# Patient Record
Sex: MALE | Race: WHITE | Employment: FULL TIME | URBAN - NONMETROPOLITAN AREA
[De-identification: names, ages, dates, MRNs, and addresses within clinical notes are randomized per-mention and may not be internally consistent; named-entity substitution may affect disease eponyms.]

---

## 2020-08-11 ENCOUNTER — OFFICE VISIT (OUTPATIENT)
Dept: PODIATRY | Age: 30
End: 2020-08-11
Payer: COMMERCIAL

## 2020-08-11 VITALS — WEIGHT: 175 LBS | BODY MASS INDEX: 29.16 KG/M2 | HEIGHT: 65 IN

## 2020-08-11 PROCEDURE — 11730 AVULSION NAIL PLATE SIMPLE 1: CPT | Performed by: PODIATRIST

## 2020-08-11 PROCEDURE — 99203 OFFICE O/P NEW LOW 30 MIN: CPT | Performed by: PODIATRIST

## 2020-08-11 RX ORDER — DOXYCYCLINE HYCLATE 100 MG/1
100 CAPSULE ORAL 2 TIMES DAILY
Qty: 14 CAPSULE | Refills: 0 | Status: SHIPPED | OUTPATIENT
Start: 2020-08-11 | End: 2020-08-18

## 2020-08-11 RX ORDER — HYDROCODONE BITARTRATE AND ACETAMINOPHEN 5; 325 MG/1; MG/1
1 TABLET ORAL EVERY 4 HOURS PRN
COMMUNITY
End: 2020-08-25

## 2020-08-11 RX ORDER — CEPHALEXIN 500 MG/1
500 CAPSULE ORAL 4 TIMES DAILY
COMMUNITY
End: 2020-08-25

## 2020-08-11 NOTE — PROGRESS NOTES
New patient in office referred from [facility] for left foot injury. Dropped a sheet of metal on left foot while at work. Currently wearing post op shoe and using crutches. Xrays obtained prior to apt.     20  Bianka Rubin : 1990 Sex: male  Age: 26 y.o. Patient was referred by Marycruz Rodriges MD    CC:    Injury left great toe at work    HPI:   This pleasant 40-year-old male patient is referred to me today injury left great toe. Was at work over the weekend and did drop a sheet of metal on his left foot. Did have work approved boots on. States has had redness and some drainage left great toe since initial injury. Did go to [facility] and was started on oral antibiotics. Did have radiographs showing fracture left great toe. Has been walking with a surgical shoe with the assistance of crutches. Still having pain left great toe. Still having redness around the toenail. States the nail is very loose. Has not been able to return to work yet. Denies any nausea vomiting fever chills shortness of breath. No additional pedal complaints at this time. ROS:  Const: Denies constitutional symptoms  Musculo: Denies symptoms other than stated above  Skin: Denies symptoms other than stated above       Current Outpatient Medications:     cephALEXin (KEFLEX) 500 MG capsule, Take 500 mg by mouth 4 times daily, Disp: , Rfl:     HYDROcodone-acetaminophen (NORCO) 5-325 MG per tablet, Take 1 tablet by mouth every 4 hours as needed for Pain., Disp: , Rfl:     doxycycline hyclate (VIBRAMYCIN) 100 MG capsule, Take 1 capsule by mouth 2 times daily for 7 days, Disp: 14 capsule, Rfl: 0    aspirin 81 MG tablet, Take 81 mg by mouth daily. , Disp: , Rfl:   Allergies   Allergen Reactions    Pcn [Penicillins]      Rash      Sulfa Antibiotics      Rash         No past medical history on file.         Vitals:    20 0955   Weight: 175 lb (79.4 kg)   Height: 5' 5\" (1.651 m)       Work History/Social History: Focused Lower Extremity Physical Exam:    Neurovascular examination:    Dorsalis Pedis palpable bilateral.  Posterior tibialis palpable bilateral.    Capillary Refill Time:  Immediate return  Hair growth:  Symmetrical and bilateral   Skin:  Not atrophic  Edema: No edema bilateral feet or ankles. Neurologic:  Light touch intact bilateral.      Musculoskeletal/ Orthopedic examination:    Equinis: Absent bilateral  Dorsiflexion, plantarflexion, inversion, eversion bilateral 5 out of 5 muscle strength  Wiggling toes  Negative Homans  Tenderness to palpation entire left great toenail. No pain to palpation metatarsal head left great toe    Dermatology examination:    Surrounding erythema and edema left great toe surrounding the toenail. Toenail is very loose. After nail removed no exposed bone or tendon. Wound 100% granular. Assessment and Plan:  Madison Zhao was seen today for foot injury and foot pain. Diagnoses and all orders for this visit:    Left foot pain  -     XR FOOT LEFT (MIN 3 VIEWS); Future    Displaced fracture of distal phalanx of left great toe, initial encounter for closed fracture    Ingrown nail    Cellulitis of left toe    Other orders  -     doxycycline hyclate (VIBRAMYCIN) 100 MG capsule; Take 1 capsule by mouth 2 times daily for 7 days      Patient seen new workers comp injury left great toe. Does have fracture distal phalanx left great toe with ingrown and infected left great toenail    After verbal consent for nail avulsion, alcohol prep was used ethyl chloride used over injection site with 6cc 1% lidocaine plain injected dorsal approach medial and lateral great toe in standard fashion. Betadine prep was used over the great toe, tourniquet was applied. Sterile instrumentation was used with a freer to free the border of the ingrown portion of the toenail. English anvil used in standard linear technique to cut the ingrown portion of the nail.   Hemostat was used to remove the

## 2020-08-14 ENCOUNTER — TELEPHONE (OUTPATIENT)
Dept: PODIATRY | Age: 30
End: 2020-08-14

## 2020-08-25 ENCOUNTER — OFFICE VISIT (OUTPATIENT)
Dept: PODIATRY | Age: 30
End: 2020-08-25
Payer: COMMERCIAL

## 2020-08-25 PROCEDURE — 99213 OFFICE O/P EST LOW 20 MIN: CPT | Performed by: PODIATRIST

## 2020-08-25 NOTE — PROGRESS NOTES
Patient in office to follow up with left great toe injury. Currently wearing post op shoe and using crutches. Xrays obtained prior to apt. States pain has improved and is able to bear more weight on left foot. [place] [employer] : 1990 Sex: male  Age: 26 y.o. Patient was referred by Dalila Saldivar MD    CC:    2-week follow-up left great toe fracture    HPI:   2-week follow-up left great toe fracture  Has been wearing a surgical shoe does have assistance of crutches but states he has been walking more this week. Has been changing bandage with Neosporin and Band-Aid once daily. Did have repeat radiographs taken today. Denies any swelling or any new pain. Very pleased with overall progression. Has not returned to work yet. No calf pain. Denies any nausea vomiting fever chills shortness of breath. ROS:  Const: Denies constitutional symptoms  Musculo: Denies symptoms other than stated above  Skin: Denies symptoms other than stated above     No current outpatient medications on file. Allergies   Allergen Reactions    Pcn [Penicillins]      Rash      Sulfa Antibiotics      Rash         No past medical history on file. There were no vitals filed for this visit. Work History/Social History:     Focused Lower Extremity Physical Exam:    Neurovascular examination:    Dorsalis Pedis palpable bilateral.  Posterior tibialis palpable bilateral.    Capillary Refill Time:  Immediate return  Hair growth:  Symmetrical and bilateral   Skin:  Not atrophic  Edema: No edema bilateral feet or ankles. Neurologic:  Light touch intact bilateral.      Musculoskeletal/ Orthopedic examination:    Equinis: Absent bilateral  Dorsiflexion, plantarflexion, inversion, eversion bilateral 5 out of 5 muscle strength  Wiggling toes  Negative Homans  Mild tenderness to palpation left great toe. Improving. Dermatology examination:    Skin well coapted left great toe. No open wounds.       Assessment and Plan:  Josefa Mcneil was seen today for follow-up and foot injury. Diagnoses and all orders for this visit:    Displaced fracture of distal phalanx of left great toe, initial encounter for closed fracture  -     XR FOOT LEFT (MIN 3 VIEWS); Future    Left foot pain    Pain of left great toe        2-week follow-up distal phalanx fracture left great toe. Has completed oral antibiotics. No open wounds today progressing well. Did apply Coban aline taping left great toe and left second toe. Continue surgical shoe with heel contact weightbearing. Did recommend 2 additional weeks no work. Hopeful transition to regular shoe in 2 weeks. We will obtain repeat weightbearing radiographs at this time. Return in about 2 weeks (around 9/8/2020). Seen By:  Tigre Loco DPM      Document was created using voice recognition software. Note was reviewed, however may contain grammatical errors.

## 2020-08-25 NOTE — LETTER
8/25/2020        I saw Marjorie Vazquez follow-up left great toe fracture. Radiographs reviewed today. Patient does have displaced distal phalanx fracture left great toe. I still did recommend continued use of surgical shoe with assistance of crutches. I will follow-up 2 weeks for repeat weightbearing radiographs. Hopeful transition to regular shoes in the next several weeks. I did recommend patient off work until follow-up in 2 weeks. Call anytime with any questions or concerns.         Sincerely,  Gaye Lewis DPM      [address]  [address] [id number]  Phone: [phone]  Fax: [phone]

## 2020-09-08 ENCOUNTER — OFFICE VISIT (OUTPATIENT)
Dept: PODIATRY | Age: 30
End: 2020-09-08
Payer: COMMERCIAL

## 2020-09-08 VITALS — RESPIRATION RATE: 14 BRPM | HEIGHT: 65 IN | WEIGHT: 175 LBS | BODY MASS INDEX: 29.16 KG/M2

## 2020-09-08 PROCEDURE — 99213 OFFICE O/P EST LOW 20 MIN: CPT | Performed by: PODIATRIST

## 2020-09-08 NOTE — LETTER
9/8/2020        I saw Pieter Hearing follow-up left foot fracture. Return to work Monday, September 14, 2020 light duty. I will follow-up 2 weeks to monitor overall progression prior to releasing full duty. Call anytime with any questions or concerns.         Sincerely,  Ladan Eid DPM      [address]  [address] [address]  Phone: [phone]  Fax: [phone]

## 2020-09-08 NOTE — PROGRESS NOTES
Patient in office to follow up with left great toe work related injury. DOI 20. Currently wearing sneakers and using crutches as needed. Melinda Hernandez obtained at today's visit. States pain has improved and is able to bear more weight on left foot. He states he has not been using crutches at home. [facility] : 1990 Sex: male  Age: 26 y.o. Patient was referred by Dian Rain MD    CC:    4-week follow-up left great toe fracture    HPI:   4-week follow-up left great toe fracture    I still been applying a Band-Aid left great toe. No open skin lesions or abrasions. Significant improvement decrease in overall pain left foot. Has been walking more without the use of crutches. Does continue use of  Surgical walking shoe. Would like to return to work at least on light duty next week. Did have repeat radiographs taken today. ROS:  Const: Denies constitutional symptoms  Musculo: Denies symptoms other than stated above  Skin: Denies symptoms other than stated above     No current outpatient medications on file. Allergies   Allergen Reactions    Pcn [Penicillins]      Rash      Sulfa Antibiotics      Rash         No past medical history on file. Vitals:    20 1055   Resp: 14   Weight: 175 lb (79.4 kg)   Height: 5' 5\" (1.651 m)       Work History/Social History:     Focused Lower Extremity Physical Exam:    Neurovascular examination:    Dorsalis Pedis palpable bilateral.  Posterior tibialis palpable bilateral.    Capillary Refill Time:  Immediate return  Hair growth:  Symmetrical and bilateral   Skin:  Not atrophic  Edema: No edema bilateral feet or ankles.   Neurologic:  Light touch intact bilateral.      Musculoskeletal/ Orthopedic examination:    Equinis: Absent bilateral  Dorsiflexion, plantarflexion, inversion, eversion bilateral 5 out of 5 muscle strength  Wiggling toes  Negative Homans  No significant tenderness to palpation left great toe    Dermatology examination:    Skin well coapted left great toe. No open wounds. Assessment and Plan:  Wolfgang Adair was seen today for foot injury and follow-up. Diagnoses and all orders for this visit:    Closed displaced fracture of distal phalanx of left great toe with routine healing, subsequent encounter  -     XR FOOT LEFT (MIN 3 VIEWS); Future    Pain of left great toe    Ingrown nail        4-week follow-up distal phalanx fracture left great toe. Radiographs reviewed. No increased displacement from previous radiograph. Skin is well coapted left great toe. Patient is approximately 4 weeks following fracture left great toe progressing very well. Continue use of surgical shoe. Continue aline taping with Coban once daily. Return to work next week light duty. I will follow-up 2 weeks prior to clearing patient for full return to work. Return in about 2 weeks (around 9/22/2020). Seen By:  Razia Kumar DPM      Document was created using voice recognition software. Note was reviewed, however may contain grammatical errors.

## 2020-09-22 ENCOUNTER — OFFICE VISIT (OUTPATIENT)
Dept: PODIATRY | Age: 30
End: 2020-09-22
Payer: COMMERCIAL

## 2020-09-22 VITALS — BODY MASS INDEX: 29.16 KG/M2 | WEIGHT: 175 LBS | HEIGHT: 65 IN | RESPIRATION RATE: 14 BRPM

## 2020-09-22 PROCEDURE — 99213 OFFICE O/P EST LOW 20 MIN: CPT | Performed by: PODIATRIST

## 2020-09-22 NOTE — PROGRESS NOTES
Patient in office to follow up with left great toe work related injury. DOI 20. Currently wearing steel toe boots. Xrays obtained at today's visit. He states the pain is improving but does tend to get a little sore at the end of the work day. No other complaints at this time. Oma Goodwin : 1990 Sex: male  Age: 26 y.o. Patient was referred by Alaina Lyons MD    CC:    6-week follow-up left great toe fracture    HPI:   6-week follow-up left great toe fracture    Has returned to work. Currently wearing steel toed work boots. No open skin lesions or abrasions. Did have repeat radiographs taken today. Is approximately 6 weeks following up from open left great toe fracture. No new injuries. Does still have some tenderness at the end of the day after he has been in his work boots but progressing very well. No new pedal complaints at this time. ROS:  Const: Denies constitutional symptoms  Musculo: Denies symptoms other than stated above  Skin: Denies symptoms other than stated above     No current outpatient medications on file. Allergies   Allergen Reactions    Pcn [Penicillins]      Rash      Sulfa Antibiotics      Rash         No past medical history on file. Vitals:    20 1109   Resp: 14   Weight: 175 lb (79.4 kg)   Height: 5' 5\" (1.651 m)       Work History/Social History:     Focused Lower Extremity Physical Exam:    Neurovascular examination:    Dorsalis Pedis palpable bilateral.  Posterior tibialis palpable bilateral.    Capillary Refill Time:  Immediate return  Hair growth:  Symmetrical and bilateral   Skin:  Not atrophic  Edema: No edema bilateral feet or ankles.   Neurologic:  Light touch intact bilateral.      Musculoskeletal/ Orthopedic examination:    Equinis: Absent bilateral  Dorsiflexion, plantarflexion, inversion, eversion bilateral 5 out of 5 muscle strength  Wiggling toes  Negative Homans  No significant tenderness to palpation left great toe    Dermatology examination:    Skin is well coapted left great toe. New nail appears to be growing back in. No open skin lesions or abrasions noted. Assessment and Plan:  Huy Talley was seen today for toe injury. Diagnoses and all orders for this visit:    Closed displaced fracture of distal phalanx of left great toe with routine healing, subsequent encounter  -     XR FOOT LEFT (MIN 3 VIEWS); Future    Pain of left great toe    Ingrown nail      6-week follow-up distal phalanx fracture left great toe. Radiographs reviewed. Does appear to be mild bony consolidation. Still does appear to be displaced fracture distal phalanx left great toe. No increased displacement from previous radiograph. Comparison radiographs August does appear to be healing. No new wounds patient progressing well. Continue work with regular steel toed boots. I did recommend follow-up 1 month for likely repeat weightbearing radiographs. New nail does appear to be growing in. Any new pain or any open wounds come in immediately. I did recommend continued work with no climbing and no ladders. I will follow-up 1 month. Return in about 1 month (around 10/22/2020). Seen By:  Sebas West DPM      Document was created using voice recognition software. Note was reviewed, however may contain grammatical errors.

## 2020-09-22 NOTE — LETTER
9/22/2020        I saw Prachi Rosanna follow-up left foot fracture. Progressing well. Return to work full activity. I did recommend no climbing and no ladders until follow-up. I will see follow-up in office 1 month. Call anytime with any questions or concerns.         Sincerely,  Sonia Hernandez DPM      [address]  [address] [address]  Phone: [phone]  Fax: [phone]

## 2020-10-23 ENCOUNTER — OFFICE VISIT (OUTPATIENT)
Dept: PODIATRY | Age: 30
End: 2020-10-23
Payer: COMMERCIAL

## 2020-10-23 VITALS — BODY MASS INDEX: 29.16 KG/M2 | HEIGHT: 65 IN | WEIGHT: 175 LBS | RESPIRATION RATE: 14 BRPM

## 2020-10-23 PROCEDURE — 99213 OFFICE O/P EST LOW 20 MIN: CPT | Performed by: PODIATRIST

## 2020-10-23 NOTE — PROGRESS NOTES
wounds. No surrounding erythema or edema. Assessment and Plan:  Feroz Sanchez was seen today for follow-up and toe injury. Diagnoses and all orders for this visit:    Closed displaced fracture of distal phalanx of left great toe with routine healing, subsequent encounter  -     XR FOOT LEFT (MIN 3 VIEWS); Future    Injury of left great toe, sequela      2-month follow-up left great toe distal phalanx displaced fracture    Has been working with posterior toed boots and has been wearing regular boots   Return to work full activity no restrictions. We did update his Worker's Comp. information today. Full return to work. Radiographs reviewed. Does have displaced fracture distal phalanx which has healed. No increased displacement from previous radiograph. Importance of continued use of well supported walking shoe. I will follow-up as needed. Return if symptoms worsen or fail to improve. Seen By:  Kraig Malave DPM      Document was created using voice recognition software. Note was reviewed, however may contain grammatical errors.